# Patient Record
Sex: FEMALE | Race: BLACK OR AFRICAN AMERICAN
[De-identification: names, ages, dates, MRNs, and addresses within clinical notes are randomized per-mention and may not be internally consistent; named-entity substitution may affect disease eponyms.]

---

## 2019-08-30 ENCOUNTER — HOSPITAL ENCOUNTER (OUTPATIENT)
Dept: HOSPITAL 75 - RAD | Age: 69
End: 2019-08-30
Attending: NURSE PRACTITIONER
Payer: MEDICARE

## 2019-08-30 DIAGNOSIS — I25.10: ICD-10-CM

## 2019-08-30 DIAGNOSIS — J44.9: ICD-10-CM

## 2019-08-30 DIAGNOSIS — F17.210: ICD-10-CM

## 2019-08-30 DIAGNOSIS — Z12.2: Primary | ICD-10-CM

## 2019-08-30 NOTE — DIAGNOSTIC IMAGING REPORT
INDICATION: Current smoker with a 60-pack-year history. 



COMPARISON: None.



EXAMINATION: CT chest screening without contrast. 



FINDINGS: The lungs show changes of centrilobular emphysema. No

evidence for acute pneumonia. No dominant mass or suspicious

pulmonary nodularity. No thoracic effusion or pneumothorax. There

are coronary arterial atherosclerotic vascular calcifications.



IMPRESSION: 

1. No suspicious mass. Low dose CT screening followup in one

years time recommended. 

2. Background COPD and atherosclerosis.



LungRads category 2  



Dictated by: 



  Dictated on workstation # [id number]